# Patient Record
Sex: FEMALE | Race: WHITE | Employment: OTHER | URBAN - METROPOLITAN AREA
[De-identification: names, ages, dates, MRNs, and addresses within clinical notes are randomized per-mention and may not be internally consistent; named-entity substitution may affect disease eponyms.]

---

## 2021-05-02 ENCOUNTER — APPOINTMENT (OUTPATIENT)
Dept: GENERAL RADIOLOGY | Age: 71
End: 2021-05-02
Attending: STUDENT IN AN ORGANIZED HEALTH CARE EDUCATION/TRAINING PROGRAM
Payer: MEDICARE

## 2021-05-02 ENCOUNTER — HOSPITAL ENCOUNTER (EMERGENCY)
Age: 71
Discharge: HOME | End: 2021-05-02
Attending: STUDENT IN AN ORGANIZED HEALTH CARE EDUCATION/TRAINING PROGRAM
Payer: MEDICARE

## 2021-05-02 ENCOUNTER — APPOINTMENT (OUTPATIENT)
Dept: GENERAL RADIOLOGY | Age: 71
End: 2021-05-02
Attending: PHYSICIAN ASSISTANT
Payer: MEDICARE

## 2021-05-02 VITALS
BODY MASS INDEX: 24.84 KG/M2 | HEART RATE: 53 BPM | HEIGHT: 67 IN | DIASTOLIC BLOOD PRESSURE: 57 MMHG | OXYGEN SATURATION: 100 % | RESPIRATION RATE: 16 BRPM | TEMPERATURE: 98.2 F | SYSTOLIC BLOOD PRESSURE: 131 MMHG | WEIGHT: 158.29 LBS

## 2021-05-02 DIAGNOSIS — S52.572A OTHER CLOSED INTRA-ARTICULAR FRACTURE OF DISTAL END OF LEFT RADIUS, INITIAL ENCOUNTER: Primary | ICD-10-CM

## 2021-05-02 DIAGNOSIS — S52.612A TRAUMATIC CLOSED FRACTURE OF ULNAR STYLOID WITH MINIMAL DISPLACEMENT, LEFT, INITIAL ENCOUNTER: ICD-10-CM
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
## 2021-05-02 PROBLEM — S52.602A CLOSED FRACTURE OF LEFT DISTAL RADIUS AND ULNA, INITIAL ENCOUNTER: Status: ACTIVE | Noted: 2021-05-02

## 2021-05-02 PROBLEM — S52.502A CLOSED FRACTURE OF LEFT DISTAL RADIUS AND ULNA, INITIAL ENCOUNTER: Status: ACTIVE | Noted: 2021-05-02

## 2021-05-02 PROCEDURE — 73090 X-RAY EXAM OF FOREARM: CPT

## 2021-05-02 PROCEDURE — 74011250637 HC RX REV CODE- 250/637: Performed by: PHYSICIAN ASSISTANT

## 2021-05-02 PROCEDURE — 75810000302 HC ER LEVEL 2 CLOSED TREATMNT FRACTURE/DISLOCATION

## 2021-05-02 PROCEDURE — 73100 X-RAY EXAM OF WRIST: CPT

## 2021-05-02 PROCEDURE — 74011000250 HC RX REV CODE- 250: Performed by: PHYSICIAN ASSISTANT

## 2021-05-02 PROCEDURE — 99283 EMERGENCY DEPT VISIT LOW MDM: CPT

## 2021-05-02 PROCEDURE — 73110 X-RAY EXAM OF WRIST: CPT

## 2021-05-02 RX ORDER — HYDROCODONE BITARTRATE AND ACETAMINOPHEN 5; 325 MG/1; MG/1
1 TABLET ORAL
Qty: 20 TAB | Refills: 0 | Status: SHIPPED | OUTPATIENT
Start: 2021-05-02 | End: 2021-05-07

## 2021-05-02 RX ORDER — IBUPROFEN 600 MG/1
600 TABLET ORAL
Qty: 20 TAB | Refills: 0 | Status: SHIPPED | OUTPATIENT
Start: 2021-05-02

## 2021-05-02 RX ORDER — OXYCODONE HYDROCHLORIDE 5 MG/1
5 TABLET ORAL
Status: COMPLETED | OUTPATIENT
Start: 2021-05-02 | End: 2021-05-02

## 2021-05-02 RX ORDER — LIDOCAINE HYDROCHLORIDE 10 MG/ML
20 INJECTION, SOLUTION EPIDURAL; INFILTRATION; INTRACAUDAL; PERINEURAL ONCE
Status: COMPLETED | OUTPATIENT
Start: 2021-05-02 | End: 2021-05-02

## 2021-05-02 RX ADMIN — OXYCODONE 5 MG: 5 TABLET ORAL at 18:54

## 2021-05-02 RX ADMIN — LIDOCAINE HYDROCHLORIDE 20 ML: 10 INJECTION, SOLUTION EPIDURAL; INFILTRATION; INTRACAUDAL; PERINEURAL at 18:54

## 2021-05-02 NOTE — ED PROVIDER NOTES
EMERGENCY DEPARTMENT HISTORY AND PHYSICAL EXAM      Date: 5/2/2021  Patient Name: Chanel Fields    History of Presenting Illness     Chief Complaint   Patient presents with   [facility] Fall     reports trip and fall over a dog today.  Wrist Pain     left side       History Provided By: Patient    HPI: Chanel Fields, 66 y.o. female presents ambulatory with her niece to the ED with cc of less than a day of 8 out of 10 constant, aching left wrist pain that is much worse with movement and started when she was tripped up by family members dog and fell. She denies any other injuries. She has been well lately without fever. She is right-hand dominant. She tells me she is here visiting family and returns to [facility] tomorrow. There are no other complaints, changes, or physical findings at this time. PCP: Danilo Loredo MD    Current Outpatient Medications   Medication Sig Dispense Refill    ibuprofen (MOTRIN) 600 mg tablet Take 1 Tab by mouth every six (6) hours as needed for Pain. 20 Tab 0    HYDROcodone-acetaminophen (NORCO) 5-325 mg per tablet Take 1 Tab by mouth every six (6) hours as needed for Pain for up to 5 days. Max Daily Amount: 4 Tabs. 20 Tab 0     Past History     Past Medical History:  No past medical history on file. Past Surgical History:  No past surgical history on file. Family History:  No family history on file. Social History:  Social History     Tobacco Use    Smoking status: Not on file   Substance Use Topics    Alcohol use: Not on file    Drug use: Not on file       Allergies:  No Known Allergies  Review of Systems   Review of Systems   Constitutional: Negative for fatigue and fever. HENT: Negative for ear pain and sore throat. Eyes: Negative for pain, redness and visual disturbance. Respiratory: Negative for cough and shortness of breath. Cardiovascular: Negative for chest pain and palpitations.    Gastrointestinal: Negative for abdominal pain, nausea and vomiting. Genitourinary: Negative for dysuria, frequency and urgency. Musculoskeletal: Negative for back pain, gait problem, neck pain and neck stiffness. Left wrist deformity and pain   Skin: Negative for rash and wound. Neurological: Negative for dizziness, weakness, light-headedness, numbness and headaches. Physical Exam   Physical Exam  Vitals signs and nursing note reviewed. Constitutional:       General: She is not in acute distress. Appearance: She is well-developed. She is not toxic-appearing. HENT:      Head: Normocephalic and atraumatic. Jaw: No trismus. Right Ear: External ear normal.      Left Ear: External ear normal.      Nose: Nose normal.      Mouth/Throat:      Pharynx: Uvula midline. Eyes:      General: No scleral icterus. Conjunctiva/sclera: Conjunctivae normal.      Pupils: Pupils are equal, round, and reactive to light. Neck:      Musculoskeletal: Full passive range of motion without pain and normal range of motion. Cardiovascular:      Rate and Rhythm: Normal rate and regular rhythm. Pulmonary:      Effort: Pulmonary effort is normal. No tachypnea, accessory muscle usage or respiratory distress. Breath sounds: No decreased breath sounds or wheezing. Abdominal:      Palpations: Abdomen is soft. Tenderness: There is no abdominal tenderness. Musculoskeletal:      Left elbow: She exhibits normal range of motion and no swelling. No tenderness found. Left wrist: She exhibits decreased range of motion, tenderness, bony tenderness and deformity. Skin:     Findings: No rash. Neurological:      Mental Status: She is alert and oriented to person, place, and time. She is not disoriented. GCS: GCS eye subscore is 4. GCS verbal subscore is 5. GCS motor subscore is 6. Cranial Nerves: No cranial nerve deficit.    Psychiatric:         Speech: Speech normal.       Diagnostic Study Results     Labs -   No results found for this or any previous visit (from the past 12 hour(s)). Radiologic Studies -   XR WRIST LT AP/LAT   Final Result   Interval casting and reduction of the distal radial and ulnar   fractures, with significantly improved alignment, now near-anatomic. XR FOREARM LT AP/LAT   Final Result      Acute left distal radial intra-articular fracture deformity with acute   nondisplaced ulnar styloid fracture   No evidence for fracture of the proximal left radius or ulna      XR WRIST LT AP/LAT/OBL MIN 3V   Final Result      Acute left distal radial intra-articular fracture deformity with acute   nondisplaced ulnar styloid fracture   No evidence for fracture of the proximal left radius or ulna        CT Results  (Last 48 hours)    None        CXR Results  (Last 48 hours)    None        Medical Decision Making   I am the first provider for this patient. I reviewed the vital signs, available nursing notes, past medical history, past surgical history, family history and social history. Vital Signs-Reviewed the patient's vital signs. Patient Vitals for the past 12 hrs:   Temp Pulse Resp BP SpO2   05/02/21 1558 98.2 °F (36.8 °C) (!) 53 16 (!) 131/57 100 %       Pulse Oximetry Analysis - 100% on RA    Records Reviewed: Nursing Notes    Provider Notes (Medical Decision Making):   DDx: Fracture, dislocation, strain, strain, fall    ED Course:   Initial assessment performed. The patients presenting problems have been discussed, and they are in agreement with the care plan formulated and outlined with them. I have encouraged them to ask questions as they arise throughout their visit. ED Course as of May 02 2211   Lysbeth List May 02, 2021   2830 I speak to Dr. Guille Cheng and Lazaro Cardoza from the orthopedic service via perfect serve. Lazaro Cardoza PA-C is here in the emergency department to perform the fracture reduction and splint. Patient remains stable.     [EJ]      ED Course User Index  [EJ] CARY Villalta Disposition:  Discharge    PLAN:  1. Discharge Medication List as of 5/2/2021  8:20 PM        2. Follow-up Information     Follow up With Specialties Details Why Contact Info    Yenifer Honeycutt MD Orthopedic Surgery  HAND SURGERY: call if in this area for follow up [address]  [phone]          Return to ED if worse     Diagnosis     Clinical Impression:   1. Other closed intra-articular fracture of distal end of left radius, initial encounter    2. Traumatic closed fracture of ulnar styloid with minimal displacement, left, initial encounter    3.  Fall on same level from slipping, tripping or stumbling, initial encounter

## 2021-05-02 NOTE — CONSULTS
ORTHOPAEDIC CONSULT NOTE    Subjective:     Date of Consultation:  May 2, 2021      Aniceto Boast is a 66 y.o. female who is being seen for left wrist fracture. Pt reports GLF, [address] States her feet were taking out from under her by dog  Ambulates at baseline unassisted. Pt. Is right hand dominant. Pt in town visiting family - lives with  in [facility]  Patient Active Problem List    Diagnosis Date Noted    Closed fracture of left distal radius and ulna, initial encounter 2021     No family history on file. Social History     Tobacco Use    Smoking status: Not on file   Substance Use Topics    Alcohol use: Not on file     No past medical history on file. No past surgical history on file. Prior to Admission medications    Medication Sig Start Date End Date Taking? Authorizing Provider   ibuprofen (MOTRIN) 600 mg tablet Take 1 Tab by mouth every six (6) hours as needed for Pain. 21  Yes CARY Bey   HYDROcodone-acetaminophen (NORCO) 5-325 mg per tablet Take 1 Tab by mouth every six (6) hours as needed for Pain for up to 5 days. Max Daily Amount: 4 Tabs. 21 Yes CARY Bey     No current facility-administered medications for this encounter. Current Outpatient Medications   Medication Sig    ibuprofen (MOTRIN) 600 mg tablet Take 1 Tab by mouth every six (6) hours as needed for Pain.  HYDROcodone-acetaminophen (NORCO) 5-325 mg per tablet Take 1 Tab by mouth every six (6) hours as needed for Pain for up to 5 days. Max Daily Amount: 4 Tabs. No Known Allergies     Review of Systems:  A comprehensive review of systems was negative except for that written in the HPI.     Mental Status: no dementia    Objective:     Patient Vitals for the past 8 hrs:   BP Temp Pulse Resp SpO2 Height Weight   21 1558 (!) 131/57 98.2 °F (36.8 °C) (!) 53 16 100 % 5' 6.5\" (1.689 m) 71.8 kg (158 lb 4.6 oz)     Temp (24hrs), Av.2 °F (36.8 °C), Min:98.2 °F (36.8 °C), Max:98.2 °F (36.8 °C)      Gen: Well-developed,  in no acute distress   HEENT: Pink conjunctivae, hearing intact to voice, moist mucous membranes   Neck: Supple  Resp: No respiratory distress   Card: RRR, palpable distal pulse-equal bilaterally, birsk cap refill all distal digits   Abd:  non-distended  Musc: Left wrist with obvious deformity, intact flex/ext of the digtis, NV exam intact UE bilat. Skin: No skin breakdown noted. Skin warm, pink, dry  Neuro: Cranial nerves are grossly intact, no focal motor weakness, follows commands appropriately   Psych: Good insight, oriented to person, place and time, alert    Imaging Review: EXAM: XR WRIST LT AP/LAT/OBL MIN 3V, XR FOREARM LT AP/LAT   INDICATION: Right wrist and forearm pain after fall   COMPARISON: None.   FINDINGS:    Three views of the left wrist demonstrate an acute nondisplaced ulnar styloid  fracture. There is an acute fracture of the distal radius with dorsal impaction,  probably having an intra-articular component. There is moderate soft tissue  swelling as well as a wrist deformity.   2 views of the left forearm demonstrate no fracture of the proximal radius.  IMPRESSION   Acute left distal radial intra-articular fracture deformity with acute  nondisplaced ulnar styloid fracture  No evidence for fracture of the proximal left radius or ulna    Labs: No results found for this or any previous visit (from the past 24 hour(s)). Impression:     Patient Active Problem List    Diagnosis Date Noted    Closed fracture of left distal radius and ulna, initial encounter 05/02/2021     Principal Problem:    Closed fracture of left distal radius and ulna, initial encounter (5/2/2021)          Procedure:   DIscussed nature of condition and treatment options with patient. Consent signed for hematoma block and closed reduction  Left wrist fracture: Preformed hematoma block under sterile conditions into fracture site with Lidocaine 1% 20 cc.    After effective local anesthetic obtained---  Proceeded with reduction using traction, finger traps, 10 lb weight,  manual manipulation. I fitted a well padded, well molded sugartong splint for immobilization. + sling  Patient tolerated the procedure well. Neurovascular exam intact post procedure, Splint precautions reviewed. Post procedure films confirm improved alignment     Plan:       Pain meds per ED team.  Post-Reduction Films Pending  F/u with Dr. Marc Alston or your preferred [place] orthopedic specialist  by calling for an appointment     Dr. Amy Quezada aware and agrees with above plan.     Jabari Khan PA-C  [employer]

## 2022-03-19 PROBLEM — S52.602A CLOSED FRACTURE OF LEFT DISTAL RADIUS AND ULNA, INITIAL ENCOUNTER: Status: ACTIVE | Noted: 2021-05-02

## 2022-03-19 PROBLEM — S52.502A CLOSED FRACTURE OF LEFT DISTAL RADIUS AND ULNA, INITIAL ENCOUNTER: Status: ACTIVE | Noted: 2021-05-02

## 2023-05-15 RX ORDER — IBUPROFEN 600 MG/1
600 TABLET ORAL EVERY 6 HOURS PRN
COMMUNITY
Start: 2021-05-02